# Patient Record
Sex: FEMALE | Race: ASIAN | NOT HISPANIC OR LATINO | Employment: FULL TIME | URBAN - METROPOLITAN AREA
[De-identification: names, ages, dates, MRNs, and addresses within clinical notes are randomized per-mention and may not be internally consistent; named-entity substitution may affect disease eponyms.]

---

## 2017-12-11 ENCOUNTER — HOSPITAL ENCOUNTER (OUTPATIENT)
Dept: RADIOLOGY | Facility: MEDICAL CENTER | Age: 37
End: 2017-12-11
Attending: SPECIALIST
Payer: COMMERCIAL

## 2017-12-11 DIAGNOSIS — R10.32 ABDOMINAL PAIN, LEFT LOWER QUADRANT: ICD-10-CM

## 2017-12-11 PROCEDURE — 76830 TRANSVAGINAL US NON-OB: CPT

## 2018-02-05 ENCOUNTER — HOSPITAL ENCOUNTER (OUTPATIENT)
Dept: RADIOLOGY | Facility: MEDICAL CENTER | Age: 38
End: 2018-02-05
Attending: SPECIALIST
Payer: COMMERCIAL

## 2018-02-05 DIAGNOSIS — Z30.431 CHECKING OF INTRAUTERINE DEVICE: ICD-10-CM

## 2018-02-05 DIAGNOSIS — R10.2 FEMALE PELVIC PAIN: ICD-10-CM

## 2018-02-05 PROCEDURE — 76830 TRANSVAGINAL US NON-OB: CPT

## 2018-07-16 ENCOUNTER — OFFICE VISIT (OUTPATIENT)
Dept: INTERNAL MEDICINE | Facility: MEDICAL CENTER | Age: 38
End: 2018-07-16
Payer: COMMERCIAL

## 2018-07-16 VITALS
OXYGEN SATURATION: 98 % | BODY MASS INDEX: 25.62 KG/M2 | WEIGHT: 144.6 LBS | SYSTOLIC BLOOD PRESSURE: 94 MMHG | DIASTOLIC BLOOD PRESSURE: 63 MMHG | TEMPERATURE: 98.1 F | HEIGHT: 63 IN | HEART RATE: 70 BPM

## 2018-07-16 DIAGNOSIS — Z00.00 ROUTINE ADULT HEALTH MAINTENANCE: ICD-10-CM

## 2018-07-16 DIAGNOSIS — G43.829 MENSTRUAL MIGRAINE WITHOUT STATUS MIGRAINOSUS, NOT INTRACTABLE: ICD-10-CM

## 2018-07-16 DIAGNOSIS — D18.01: Primary | ICD-10-CM

## 2018-07-16 PROCEDURE — 99204 OFFICE O/P NEW MOD 45 MIN: CPT | Mod: GC | Performed by: INTERNAL MEDICINE

## 2018-07-16 ASSESSMENT — ENCOUNTER SYMPTOMS
SHORTNESS OF BREATH: 0
FEVER: 0
MYALGIAS: 0
CHILLS: 0
PALPITATIONS: 0
COUGH: 0
NAUSEA: 0
ABDOMINAL PAIN: 0
VOMITING: 0
HEARTBURN: 0

## 2018-07-16 ASSESSMENT — PATIENT HEALTH QUESTIONNAIRE - PHQ9: CLINICAL INTERPRETATION OF PHQ2 SCORE: 0

## 2018-07-16 ASSESSMENT — PAIN SCALES - GENERAL: PAINLEVEL: NO PAIN

## 2018-07-16 NOTE — PROGRESS NOTES
"New Patient to Establish    Reason to establish: New patient to establish    CC: check on skin nodules     HPI: This is a 39yo/F with a history of migraines and fibroids, here to check up on multiple subcutaneous nodules on the upper extremities.     The \"nodules\" began about 2 years ago, when she noticed 2 subcutaneous nodules which were not painful, not bothersome but she wanted to get check out just to make sure it wasn't anything to worry about. She notices that the lesion on the (L) upper arm occasionally throbs and causes her some discomfort. She denies any recent rapid changes, pruritus, trauma to the area, draining from the lesions, previous history of any skin problems. She had a family member, Dr. Hernandez (dermatologist) check maybe 1 year ago - said it was prob a sebaceaous cyst.    Patient states she has a history of migraines for years, related to her menstrual cycle. She was having monthly unilateral dull throbbing pain at the (L) frontal area, non-radiating associated with nausea and photophobia. Currently, she does not take preventive therapy, but she does have an abortive regimen which includes zofran, fish oil, Co-enzyme Q10 and massage which she states is very helpful. She was prescribed Imitrex in the past, but stated that it made her migraine worse.   4 months ago in March 2018, her gynecologist Dr. Contreras placed an IUD which has helped decrease the frequency to just once in the last two months, but with increased severity and duration of pain. She complains of joint and back pain since having the IUD placed. Her gynecologist would like for her to continue the IUD for some time in order to see if it will be effective for her.    Patient Active Problem List    Diagnosis Date Noted   • Routine adult health maintenance 07/16/2018   • Venous hemangioma of skin and subcutaneous tissue 07/16/2018   • Menstrual migraine without status migrainosus, not intractable 07/16/2018   • Dyspareunia 07/23/2013   • " Fibroid uterus 01/20/2011   • Pelvic pain in female 01/20/2011     Past Medical History:   Diagnosis Date   • Allergy    • Bronchitis 12/2012   • Migraine    • Other specified symptom associated with female genital organs    • Urinary bladder disorder     occas. UTI's     Current Outpatient Prescriptions   Medication Sig Dispense Refill   • Cholecalciferol (VITAMIN D PO) Take  by mouth.     • B Complex Vitamins (VITAMIN-B COMPLEX PO) Take  by mouth.     • ondansetron (ZOFRAN) 4 MG TABS Take 1 Tab by mouth every 8 hours as needed for Nausea/Vomiting. 15 Tab 0   • vitamin D, Ergocalciferol, (DRISDOL) 69874 UNITS CAPS capsule Take 50,000 Units by mouth every 7 days.     • acetaminophen (TYLENOL) 500 MG TABS Take 500-1,000 mg by mouth every 6 hours as needed.     • ibuprofen (MOTRIN) 200 MG TABS Take 400 mg by mouth as needed.     • ethinyl estradiol-etonogestrel (NUVARING) 0.12-0.015 MG/24HR vaginal ring Insert 1 Each in vagina every 21 days.     • Coenzyme Q10 (CO Q 10 PO) Take 200 mg by mouth as needed. For migraines     • 5-Hydroxytryptophan (5-HTP PO) Take 100 mg by mouth as needed. For migrains     • pseudoephedrine (SUDOGEST) 30 MG TABS Take 60 mg by mouth every four hours as needed.     • cetirizine (ZYRTEC) 10 MG TABS Take 10 mg by mouth as needed.     • Lactobacillus (ACIDOPHILUS PO) Take 2 Tabs by mouth every day.       No current facility-administered medications for this visit.      Allergies as of 07/16/2018 - Reviewed 07/16/2018   Allergen Reaction Noted   • Amoxicillin Hives and Rash 02/13/2010   • Levaquin Nausea 02/13/2010   • Other food Hives 07/15/2013     Social History     Social History   • Marital status: Single     Spouse name: N/A   • Number of children: N/A   • Years of education: N/A     Occupational History   • Not on file.     Social History Main Topics   • Smoking status: Never Smoker   • Smokeless tobacco: Never Used   • Alcohol use Yes      Comment: 1-2 drinks a month    • Drug use: No  "  • Sexual activity: Yes     Birth control/ protection: IUD     Other Topics Concern   • Not on file     Social History Narrative   • No narrative on file     Family History   Problem Relation Age of Onset   • Hypertension Mother    • Heart Disease Brother    • Diabetes Maternal Grandfather    • Cancer Maternal Grandfather      STOMACHE     Past Surgical History:   Procedure Laterality Date   • MYOMECTOMY ROBOTIC  7/23/2013    Performed by Allen Whitten M.D. at SURGERY [facility] ORS   • CA EXTRACTION ERUPTED TOOTH/EXR      wisdom teeth     ROS: As per HPI. Additional pertinent symptoms as noted below.    Review of Systems   Constitutional: Negative for chills and fever.   HENT: Negative for hearing loss.    Respiratory: Negative for cough and shortness of breath.    Cardiovascular: Negative for chest pain and palpitations.   Gastrointestinal: Negative for abdominal pain, heartburn, nausea and vomiting.   Genitourinary: Negative for dysuria and hematuria.   Musculoskeletal: Negative for joint pain and myalgias.   Skin: Negative for itching and rash.     BP (!) 94/63   Pulse 70   Temp 36.7 °C (98.1 °F)   Ht 1.6 m (5' 3\")   Wt 65.6 kg (144 lb 9.6 oz)   LMP 07/02/2018   SpO2 98%   Breastfeeding? No   BMI 25.61 kg/m²     Physical Exam   Constitutional: She is oriented to person, place, and time. She appears well-developed and well-nourished.   HENT:   Head: Normocephalic and atraumatic.   Eyes: Conjunctivae and EOM are normal.   Neck: Normal range of motion. Neck supple.   Cardiovascular: Normal rate, regular rhythm, normal heart sounds and intact distal pulses.    Pulmonary/Chest: Effort normal and breath sounds normal.   Abdominal: Soft. There is no tenderness.   Musculoskeletal: Normal range of motion.   Lymphadenopathy:     She has no cervical adenopathy.   Neurological: She is alert and oriented to person, place, and time.   Skin: Skin is warm and dry. No rash noted. No erythema. No pallor.      "   Psychiatric: She has a normal mood and affect. Her behavior is normal.   Vitals reviewed.  Physical Exam   Constitutional: She is oriented to person, place, and time. She appears well-developed and well-nourished.   HENT:   Head: Normocephalic and atraumatic.   Eyes: Conjunctivae and EOM are normal.   Neck: Normal range of motion. Neck supple.   Cardiovascular: Normal rate, regular rhythm, normal heart sounds and intact distal pulses.    Pulmonary/Chest: Effort normal and breath sounds normal.   Abdominal: Soft. There is no tenderness.   Musculoskeletal: Normal range of motion.   Lymphadenopathy:     She has no cervical adenopathy.   Neurological: She is alert and oriented to person, place, and time.   Skin: Skin is warm and dry. No rash noted. No erythema. No pallor.        Psychiatric: She has a normal mood and affect. Her behavior is normal.   Vitals reviewed.    Assessment and Plan  #Venous hemangioma of skin and subcutaneous tissue  - Discussed conservative measures with patient   - Reassured patient that this is a benign appearing lesion, and that surgical intervention may lead to scarring or disruption of the vessels leading to unwanted complications   - For ultrasound if there is increase in pain, nodule size, more discoloration, etc   - Discussed the possibility of sclerotherapy with referral to a vascular surgeon if the lesion becomes problematic   - Observe for now.     #Menstrual migraine without status migrainosus, not intractable  - Continue abortive measures as needed (zofran, fish oil, Coq10, sleep, massage)  - Monitor for medication overuse  - Avoid identifiable migraine triggers   - Maintain regular exercise, regular diet, sleeping habits   - Encourage continued use of IUD   - Continue follow-up with Gynecology    #Health Maintenance:  - Never smoker  - Occasional alcohol use   - No drug use   - monagamous with boyfriend, IUD in place   - Works at [employer] ([employer] pre-requisites), hoping to go into  PA school   - Pap smear done (1/2018): negative, due 1/2019   - STD screen done 1/2018  - Order CBC, CMP, TSH, lipid profile     Followup: Return in about 1 year (around 7/16/2019), or if symptoms worsen or fail to improve, for Short.    Signed by: Yara Villeda M.D.

## 2018-07-20 ENCOUNTER — HOSPITAL ENCOUNTER (OUTPATIENT)
Facility: MEDICAL CENTER | Age: 38
End: 2018-07-20
Attending: PREVENTIVE MEDICINE
Payer: COMMERCIAL

## 2018-07-20 ENCOUNTER — EH NON-PROVIDER (OUTPATIENT)
Dept: OCCUPATIONAL MEDICINE | Facility: CLINIC | Age: 38
End: 2018-07-20

## 2018-07-20 DIAGNOSIS — Z02.89 VISIT FOR OCCUPATIONAL HEALTH EXAMINATION: ICD-10-CM

## 2018-07-20 DIAGNOSIS — Z02.89 VISIT FOR OCCUPATIONAL HEALTH EXAMINATION: Primary | ICD-10-CM

## 2018-07-20 PROCEDURE — 90636 HEP A/HEP B VACC ADULT IM: CPT | Performed by: PREVENTIVE MEDICINE

## 2018-07-21 LAB
ALBUMIN SERPL-MCNC: 4.5 G/DL (ref 3.5–5.5)
ALBUMIN/GLOB SERPL: 1.6 {RATIO} (ref 1.2–2.2)
ALP SERPL-CCNC: 63 IU/L (ref 39–117)
ALT SERPL-CCNC: 12 IU/L (ref 0–32)
AST SERPL-CCNC: 13 IU/L (ref 0–40)
BASOPHILS # BLD AUTO: 0 X10E3/UL (ref 0–0.2)
BASOPHILS NFR BLD AUTO: 0 %
BILIRUB SERPL-MCNC: 0.5 MG/DL (ref 0–1.2)
BUN SERPL-MCNC: 14 MG/DL (ref 6–20)
BUN/CREAT SERPL: 16 (ref 9–23)
CALCIUM SERPL-MCNC: 9.2 MG/DL (ref 8.7–10.2)
CHLORIDE SERPL-SCNC: 101 MMOL/L (ref 96–106)
CHOLEST SERPL-MCNC: 189 MG/DL (ref 100–199)
CO2 SERPL-SCNC: 20 MMOL/L (ref 20–29)
CREAT SERPL-MCNC: 0.85 MG/DL (ref 0.57–1)
EOSINOPHIL # BLD AUTO: 0.2 X10E3/UL (ref 0–0.4)
EOSINOPHIL NFR BLD AUTO: 2 %
ERYTHROCYTE [DISTWIDTH] IN BLOOD BY AUTOMATED COUNT: 13.6 % (ref 12.3–15.4)
GLOBULIN SER CALC-MCNC: 2.9 G/DL (ref 1.5–4.5)
GLUCOSE SERPL-MCNC: 73 MG/DL (ref 65–99)
HCT VFR BLD AUTO: 45.1 % (ref 34–46.6)
HDLC SERPL-MCNC: 61 MG/DL
HGB BLD-MCNC: 15 G/DL (ref 11.1–15.9)
IF AFRICAN AMERICAN  100797: 100 ML/MIN/1.73
IF NON AFRICAN AMER 100791: 87 ML/MIN/1.73
IMM GRANULOCYTES # BLD: 0 X10E3/UL (ref 0–0.1)
IMM GRANULOCYTES NFR BLD: 0 %
IMMATURE CELLS  115398: ABNORMAL
LABORATORY COMMENT REPORT: ABNORMAL
LDLC SERPL CALC-MCNC: 102 MG/DL (ref 0–99)
LYMPHOCYTES # BLD AUTO: 3.6 X10E3/UL (ref 0.7–3.1)
LYMPHOCYTES NFR BLD AUTO: 39 %
MCH RBC QN AUTO: 29.4 PG (ref 26.6–33)
MCHC RBC AUTO-ENTMCNC: 33.3 G/DL (ref 31.5–35.7)
MCV RBC AUTO: 88 FL (ref 79–97)
MONOCYTES # BLD AUTO: 0.6 X10E3/UL (ref 0.1–0.9)
MONOCYTES NFR BLD AUTO: 7 %
MORPHOLOGY BLD-IMP: ABNORMAL
NEUTROPHILS # BLD AUTO: 4.8 X10E3/UL (ref 1.4–7)
NEUTROPHILS NFR BLD AUTO: 52 %
NRBC BLD AUTO-RTO: ABNORMAL %
PLATELET # BLD AUTO: 303 X10E3/UL (ref 150–379)
POTASSIUM SERPL-SCNC: 4.1 MMOL/L (ref 3.5–5.2)
PROT SERPL-MCNC: 7.4 G/DL (ref 6–8.5)
RBC # BLD AUTO: 5.1 X10E6/UL (ref 3.77–5.28)
RUBV AB SER QL: 45 IU/ML
SODIUM SERPL-SCNC: 138 MMOL/L (ref 134–144)
TRIGL SERPL-MCNC: 130 MG/DL (ref 0–149)
TSH SERPL DL<=0.005 MIU/L-ACNC: 3.63 UIU/ML (ref 0.45–4.5)
VLDLC SERPL CALC-MCNC: 26 MG/DL (ref 5–40)
WBC # BLD AUTO: 9.2 X10E3/UL (ref 3.4–10.8)

## 2018-07-23 LAB
M TB TUBERC IFN-G BLD QL: NEGATIVE
M TB TUBERC IFN-G/MITOGEN IGNF BLD: 0.01
M TB TUBERC IGNF/MITOGEN IGNF CONTROL: 35.62 [IU]/ML
MEV IGG SER IA-ACNC: 2
MITOGEN IGNF BCKGRD COR BLD-ACNC: 0.03 [IU]/ML
MUV IGG SER IA-ACNC: 1.08
VZV IGG SER IA-ACNC: 1.88

## 2018-07-30 ENCOUNTER — EH NON-PROVIDER (OUTPATIENT)
Dept: OCCUPATIONAL MEDICINE | Facility: CLINIC | Age: 38
End: 2018-07-30

## 2018-07-30 DIAGNOSIS — Z71.85 IMMUNIZATION COUNSELING: ICD-10-CM

## 2018-07-30 PROCEDURE — 8916 PR CLEARANCE ONLY: Performed by: PREVENTIVE MEDICINE

## 2018-08-03 ENCOUNTER — TELEPHONE (OUTPATIENT)
Dept: INTERNAL MEDICINE | Facility: MEDICAL CENTER | Age: 38
End: 2018-08-03

## 2018-08-03 NOTE — TELEPHONE ENCOUNTER
1. Caller Name: Nela Peres                      Call Back Number: [phone] (home)       2. Message: Patient called left voice message requesting lab results. Dr Villeda on vacation.    3. Patient approves office to leave a detailed voicemail/MyChart message: yes

## 2018-08-06 NOTE — TELEPHONE ENCOUNTER
Tried calling the patient but patient did not answer the call and tried leaving message but did not allow to leave the voicemail.  Advised to come to the appointment to review the results ad have further plan of management.

## 2018-10-26 ENCOUNTER — HOSPITAL ENCOUNTER (OUTPATIENT)
Dept: RADIOLOGY | Facility: MEDICAL CENTER | Age: 38
End: 2018-10-26
Attending: SPECIALIST
Payer: COMMERCIAL

## 2018-10-26 DIAGNOSIS — R10.31 ABDOMINAL PAIN, RIGHT LOWER QUADRANT: ICD-10-CM

## 2018-10-26 PROCEDURE — 76830 TRANSVAGINAL US NON-OB: CPT

## 2018-11-03 ENCOUNTER — OFFICE VISIT (OUTPATIENT)
Dept: URGENT CARE | Facility: CLINIC | Age: 38
End: 2018-11-03
Payer: COMMERCIAL

## 2018-11-03 ENCOUNTER — HOSPITAL ENCOUNTER (OUTPATIENT)
Dept: RADIOLOGY | Facility: MEDICAL CENTER | Age: 38
End: 2018-11-03
Attending: NURSE PRACTITIONER
Payer: COMMERCIAL

## 2018-11-03 VITALS
HEART RATE: 80 BPM | TEMPERATURE: 98.1 F | RESPIRATION RATE: 16 BRPM | WEIGHT: 140 LBS | HEIGHT: 63 IN | OXYGEN SATURATION: 94 % | BODY MASS INDEX: 24.8 KG/M2 | DIASTOLIC BLOOD PRESSURE: 80 MMHG | SYSTOLIC BLOOD PRESSURE: 110 MMHG

## 2018-11-03 DIAGNOSIS — R10.84 GENERALIZED ABDOMINAL PAIN: ICD-10-CM

## 2018-11-03 DIAGNOSIS — R11.0 NAUSEA: ICD-10-CM

## 2018-11-03 LAB
INT CON NEG: NORMAL
INT CON POS: NORMAL
POC URINE PREGNANCY TEST: NORMAL

## 2018-11-03 PROCEDURE — 81025 URINE PREGNANCY TEST: CPT | Performed by: NURSE PRACTITIONER

## 2018-11-03 PROCEDURE — 99202 OFFICE O/P NEW SF 15 MIN: CPT | Performed by: NURSE PRACTITIONER

## 2018-11-03 PROCEDURE — 700117 HCHG RX CONTRAST REV CODE 255: Performed by: NURSE PRACTITIONER

## 2018-11-03 PROCEDURE — 74177 CT ABD & PELVIS W/CONTRAST: CPT

## 2018-11-03 RX ORDER — DOXYCYCLINE 100 MG/10ML
INJECTION, POWDER, LYOPHILIZED, FOR SOLUTION INTRAVENOUS
COMMUNITY

## 2018-11-03 RX ORDER — ONDANSETRON 4 MG/1
4 TABLET, ORALLY DISINTEGRATING ORAL EVERY 8 HOURS PRN
Qty: 10 TAB | Refills: 0 | Status: SHIPPED | OUTPATIENT
Start: 2018-11-03

## 2018-11-03 RX ADMIN — IOHEXOL 100 ML: 350 INJECTION, SOLUTION INTRAVENOUS at 14:36

## 2018-11-03 RX ADMIN — IOHEXOL 50 ML: 240 INJECTION, SOLUTION INTRATHECAL; INTRAVASCULAR; INTRAVENOUS; ORAL at 14:35

## 2018-11-03 ASSESSMENT — ENCOUNTER SYMPTOMS
ABDOMINAL PAIN: 1
CHILLS: 0
FEVER: 0

## 2018-11-03 NOTE — PROGRESS NOTES
Subjective:      Nela Peres is a 38 y.o. female who presents with Abdominal Pain (patient had IUD removed last thursday, had CT done on friday but is still experiencing abdominal pain. Vomiting, back pain )    Past Medical History:   Diagnosis Date   • Allergy    • Bronchitis 12/2012   • Migraine    • Other specified symptom associated with female genital organs    • Urinary bladder disorder     occas. UTI's     Social History     Social History   • Marital status: Single     Spouse name: N/A   • Number of children: N/A   • Years of education: N/A     Occupational History   • Not on file.     Social History Main Topics   • Smoking status: Never Smoker   • Smokeless tobacco: Never Used   • Alcohol use Yes      Comment: 1-2 drinks a month    • Drug use: No   • Sexual activity: Yes     Birth control/ protection: IUD     Other Topics Concern   • Not on file     Social History Narrative   • No narrative on file     Family History   Problem Relation Age of Onset   • Hypertension Mother    • Heart Disease Brother    • Diabetes Maternal Grandfather    • Cancer Maternal Grandfather         STOMACHE       Allergies: Amoxicillin; Levaquin; and Other food    Patient is a 38-year-old female who presents today with complaint of generalized abdominal pain.  Symptoms have been ongoing over the last week.  States that 9 days ago she had an IUD removed and she has been having pain since.  Her gynecologist did a pelvic ultrasound 7 days ago and this was within normal limits.  Patient states that she has not gone to work this week and has continued to have pain.  Patient has been on doxycycline for a presumed urinary tract infection and states that she began to have epigastric pain and nausea with occasional vomiting after starting the doxycycline.  Her primary concern is the lower abdominal pain and states that that is the greatest area of discomfort at this time.          Abdominal Pain   This is a new problem. The current  "episode started 1 to 4 weeks ago. The onset quality is undetermined. The problem occurs intermittently. The problem has been waxing and waning. The pain is located in the generalized abdominal region. The pain is moderate. The quality of the pain is aching. Pertinent negatives include no fever. Nothing aggravates the pain. The pain is relieved by nothing. She has tried nothing for the symptoms. The treatment provided no relief.       Review of Systems   Constitutional: Positive for malaise/fatigue. Negative for chills and fever.   Gastrointestinal: Positive for abdominal pain.   All other systems reviewed and are negative.         Objective:     /80   Pulse 80   Temp 36.7 °C (98.1 °F)   Resp 16   Ht 1.6 m (5' 3\")   Wt 63.5 kg (140 lb)   SpO2 94%   BMI 24.80 kg/m²      Physical Exam   Constitutional: She is oriented to person, place, and time. She appears well-developed and well-nourished.   Neck: Normal range of motion. Neck supple.   Cardiovascular: Normal rate and regular rhythm.    Pulmonary/Chest: Effort normal and breath sounds normal.   Abdominal: Soft. Bowel sounds are normal. She exhibits no distension. There is tenderness. There is no rebound and no guarding.   Abdomen is soft with no guarding or rebound tenderness.  Patient is generally tender across the lower abdomen and in the epigastric and left upper quadrant.    Neurological: She is alert and oriented to person, place, and time.   Skin: Skin is warm and dry. Capillary refill takes less than 2 seconds.   Psychiatric: She has a normal mood and affect. Her behavior is normal. Judgment and thought content normal.   Vitals reviewed.    UA:     Urine hCG: negative     CT abd/pelvis with:     11/3/2018 2:34 PM    HISTORY/REASON FOR EXAM:  Pain.  Epigastric pain, nausea and vomiting    TECHNIQUE/EXAM DESCRIPTION:  CT scan of the abdomen and pelvis with contrast.    Contrast-enhanced helical scanning was obtained from the diaphragmatic domes " through the pubic symphysis following the bolus administration of nonionic contrast without complication.    100, 50 mL of Omnipaque 350 nonionic contrast was administered without complication.    Low dose optimization technique was utilized for this CT exam including automated exposure control and adjustment of the mA and/or kV according to patient size.    COMPARISON: No prior studies available.    FINDINGS:  Lung bases: The visualized lung bases are clear.    Abdomen:  No free air is seen in the abdomen or pelvis. There is a hypodense lesion in the posterior right lobe of the liver with peripheral nodular enhancement measuring 2.7 x 2 cm compatible with a hemangioma. Portal vein is patent. Gallbladder appears   unremarkable. There is no biliary ductal dilatation. Pancreas appears unremarkable. Spleen is not enlarged. No adrenal mass is identified. There is an extrarenal pelvis on the right. There is no evidence of hydronephrosis. Aorta is not aneurysmal. There   are small inguinal, retroperitoneal and mesenteric lymph nodes.    There is no evidence of bowel obstruction. The distal descending and proximal sigmoid colon is decompressed, limiting evaluation. Terminal ileum and visualized appendix are unremarkable.    Bladder is moderately distended. Uterus is anteverted. There is a corpus luteum cyst on the left measuring 2 cm.    No free fluid is seen in the abdomen or pelvis.    No acute osseous abnormality is identified.   Impression       No acute intra-abdominal abnormality is seen.    2.7 x 2 cm hepatic lesion likely represents a hemangioma.    Extrarenal pelvis on the right. No evidence of hydronephrosis.    2 cm left ovarian corpus luteum cyst.     Patient notified by phone at 6 PM of results.  Patient verbalized understanding and agreement.  She is nauseous and a will send prescription for Zofran to her pharmacy for management of this.  I have advised patient also follow-up with her gynecologist,   Fish next week.  ER precautions for worsening of pain.          Assessment/Plan:     1. Generalized abdominal pain    - POCT Urinalysis  - POCT Pregnancy  -zofran odt as needed for nausea.  -CT abd/pelvis with contrast; will call results.   -ER precautions for worsening of symtpoms  -follow up also with GYN next week.

## 2019-03-08 ENCOUNTER — HOSPITAL ENCOUNTER (OUTPATIENT)
Dept: RADIOLOGY | Facility: MEDICAL CENTER | Age: 39
End: 2019-03-08
Attending: FAMILY MEDICINE
Payer: COMMERCIAL

## 2019-03-08 DIAGNOSIS — J45.901 EXTRINSIC ASTHMA WITH ACUTE EXACERBATION, UNSPECIFIED ASTHMA SEVERITY, UNSPECIFIED WHETHER PERSISTENT: ICD-10-CM

## 2019-03-08 PROCEDURE — 71046 X-RAY EXAM CHEST 2 VIEWS: CPT

## 2019-03-14 ENCOUNTER — OFFICE VISIT (OUTPATIENT)
Dept: URGENT CARE | Facility: CLINIC | Age: 39
End: 2019-03-14
Payer: COMMERCIAL

## 2019-03-14 VITALS
TEMPERATURE: 98.1 F | BODY MASS INDEX: 24.8 KG/M2 | HEART RATE: 90 BPM | OXYGEN SATURATION: 99 % | DIASTOLIC BLOOD PRESSURE: 78 MMHG | WEIGHT: 140 LBS | HEIGHT: 63 IN | RESPIRATION RATE: 16 BRPM | SYSTOLIC BLOOD PRESSURE: 118 MMHG

## 2019-03-14 DIAGNOSIS — R09.82 PND (POST-NASAL DRIP): ICD-10-CM

## 2019-03-14 DIAGNOSIS — J20.9 BRONCHITIS WITH ASTHMA, ACUTE: ICD-10-CM

## 2019-03-14 DIAGNOSIS — J45.909 BRONCHITIS WITH ASTHMA, ACUTE: ICD-10-CM

## 2019-03-14 PROCEDURE — 99214 OFFICE O/P EST MOD 30 MIN: CPT | Performed by: NURSE PRACTITIONER

## 2019-03-14 RX ORDER — DOXYCYCLINE HYCLATE 100 MG
100 TABLET ORAL 2 TIMES DAILY
Qty: 14 TAB | Refills: 0 | Status: SHIPPED | OUTPATIENT
Start: 2019-03-14 | End: 2019-03-21

## 2019-03-14 RX ORDER — FLUTICASONE PROPIONATE 50 MCG
2 SPRAY, SUSPENSION (ML) NASAL DAILY
Qty: 1 BOTTLE | Refills: 3 | Status: SHIPPED | OUTPATIENT
Start: 2019-03-14

## 2019-03-14 RX ORDER — METHYLPREDNISOLONE 4 MG/1
TABLET ORAL
Qty: 1 KIT | Refills: 0 | Status: SHIPPED | OUTPATIENT
Start: 2019-03-14

## 2019-03-14 ASSESSMENT — ENCOUNTER SYMPTOMS
WEAKNESS: 0
SORE THROAT: 0
HEADACHES: 0
BACK PAIN: 0
WHEEZING: 0
FEVER: 0
PALPITATIONS: 0
SHORTNESS OF BREATH: 0
MYALGIAS: 0
COUGH: 1
TINGLING: 0
CHILLS: 0
DIZZINESS: 0
SENSORY CHANGE: 0
ORTHOPNEA: 0
SPUTUM PRODUCTION: 1
SINUS PAIN: 0

## 2019-03-14 NOTE — PROGRESS NOTES
"Subjective:      Nela Peres is a 38 y.o. female who presents with Cough (x4 months, coughing up yellow mucus x10 days, really bad this morning and had a bad asthma attack)            HPI  C/o cough x 1 month. States has yellow phlegm with mild asthma problems. Was seen for this with PCP and CXR done with negative results. Using albuterol, Advair just introduced and a steroid inhaler which she has been using. Denies fever or fatigue. States \"cough just won't go away\". States nasal congestion with PND, using saline rinse without flonase use. [place].    PMH:  has a past medical history of Allergy; Bronchitis (12/2012); Migraine; Other specified symptom associated with female genital organs; and Urinary bladder disorder.  MEDS:   Current Outpatient Prescriptions:   •  doxycycline (VIBRAMYCIN) 100 MG Tab, Take 1 Tab by mouth 2 times a day for 7 days., Disp: 14 Tab, Rfl: 0  •  MethylPREDNISolone (MEDROL DOSEPAK) 4 MG Tablet Therapy Pack, Use as directed, Disp: 1 Kit, Rfl: 0  •  fluticasone (FLONASE) 50 MCG/ACT nasal spray, Spray 2 Sprays in nose every day., Disp: 1 Bottle, Rfl: 3  •  doxycycline (VIBRAMYCIN) 100 MG Recon Soln, by Intravenous route., Disp: , Rfl:   •  ondansetron (ZOFRAN ODT) 4 MG TABLET DISPERSIBLE, Take 1 Tab by mouth every 8 hours as needed for Nausea., Disp: 10 Tab, Rfl: 0  •  Cholecalciferol (VITAMIN D PO), Take  by mouth., Disp: , Rfl:   •  B Complex Vitamins (VITAMIN-B COMPLEX PO), Take  by mouth., Disp: , Rfl:   •  ondansetron (ZOFRAN) 4 MG TABS, Take 1 Tab by mouth every 8 hours as needed for Nausea/Vomiting. (Patient not taking: Reported on 11/3/2018), Disp: 15 Tab, Rfl: 0  •  vitamin D, Ergocalciferol, (DRISDOL) 43705 UNITS CAPS capsule, Take 50,000 Units by mouth every 7 days., Disp: , Rfl:   •  acetaminophen (TYLENOL) 500 MG TABS, Take 500-1,000 mg by mouth every 6 hours as needed., Disp: , Rfl:   •  ibuprofen (MOTRIN) 200 MG TABS, Take 400 mg by mouth as " needed., Disp: , Rfl:   •  ethinyl estradiol-etonogestrel (NUVARING) 0.12-0.015 MG/24HR vaginal ring, Insert 1 Each in vagina every 21 days., Disp: , Rfl:   •  Coenzyme Q10 (CO Q 10 PO), Take 200 mg by mouth as needed. For migraines, Disp: , Rfl:   •  5-Hydroxytryptophan (5-HTP PO), Take 100 mg by mouth as needed. For migrains, Disp: , Rfl:   •  pseudoephedrine (SUDOGEST) 30 MG TABS, Take 60 mg by mouth every four hours as needed., Disp: , Rfl:   •  cetirizine (ZYRTEC) 10 MG TABS, Take 10 mg by mouth as needed., Disp: , Rfl:   •  Lactobacillus (ACIDOPHILUS PO), Take 2 Tabs by mouth every day., Disp: , Rfl:   ALLERGIES:   Allergies   Allergen Reactions   • Amoxicillin Hives and Rash   • Levaquin Nausea     dizzy   • Other Food Hives     Chocolate causes migrains     SURGHX:   Past Surgical History:   Procedure Laterality Date   • MYOMECTOMY ROBOTIC  7/23/2013    Performed by Allen Whitten M.D. at SURGERY [facility]S   • [place] EXTRACTION ERUPTED TOOTH/EXR      wisdom teeth     SOCHX:  reports that she has never smoked. She has never used smokeless tobacco. She reports that she drinks alcohol. She reports that she does not use drugs.  FH: Family history was reviewed, no pertinent findings to report    Review of Systems   Constitutional: Negative for chills, fever and malaise/fatigue.   HENT: Positive for congestion. Negative for ear pain, sinus pain and sore throat.    Respiratory: Positive for cough and sputum production. Negative for shortness of breath and wheezing.    Cardiovascular: Negative for chest pain, palpitations and orthopnea.   Musculoskeletal: Negative for back pain and myalgias.   Skin: Negative for itching and rash.   Neurological: Negative for dizziness, tingling, sensory change, weakness and headaches.   Endo/Heme/Allergies: Negative for environmental allergies.   All other systems reviewed and are negative.         Objective:     /78   Pulse 90   Temp 36.7 °C (98.1 °F)   Resp 16   Ht  "1.6 m (5' 3\")   Wt 63.5 kg (140 lb)   SpO2 99%   BMI 24.80 kg/m²      Physical Exam   Constitutional: She is oriented to person, place, and time. She appears well-developed and well-nourished. She is active and cooperative.  Non-toxic appearance. She does not have a sickly appearance. She does not appear ill. No distress.   HENT:   Head: Normocephalic.   Right Ear: External ear and ear canal normal. A middle ear effusion is present.   Left Ear: External ear and ear canal normal. A middle ear effusion is present.   Nose: Mucosal edema and rhinorrhea present.   Mouth/Throat: Uvula is midline. Mucous membranes are dry. No uvula swelling. Posterior oropharyngeal erythema present. No posterior oropharyngeal edema.   Eyes: Pupils are equal, round, and reactive to light. Conjunctivae and EOM are normal. Right eye exhibits no discharge. Left eye exhibits no discharge.   Neck: Normal range of motion.   Cardiovascular: Normal rate and regular rhythm.    Pulmonary/Chest: Effort normal. No accessory muscle usage. No respiratory distress. She has no decreased breath sounds. She has no wheezes. She has no rhonchi. She has no rales.   Abdominal: Soft. Bowel sounds are normal. She exhibits no distension. There is no tenderness. There is no rebound and no guarding.   Musculoskeletal: Normal range of motion.   Lymphadenopathy:     She has no cervical adenopathy.   Neurological: She is alert and oriented to person, place, and time.   Skin: Skin is warm and dry. She is not diaphoretic.               Assessment/Plan:     1. Bronchitis with asthma, acute    - doxycycline (VIBRAMYCIN) 100 MG Tab; Take 1 Tab by mouth 2 times a day for 7 days.  Dispense: 14 Tab; Refill: 0  - MethylPREDNISolone (MEDROL DOSEPAK) 4 MG Tablet Therapy Pack; Use as directed  Dispense: 1 Kit; Refill: 0    2. PND (post-nasal drip)    - fluticasone (FLONASE) 50 MCG/ACT nasal spray; Spray 2 Sprays in nose every day.  Dispense: 1 Bottle; Refill: 3     Increase " water intake  May use Ibuprofen/Tylenol prn for fever or body aches  Get rest  May use daily longer acting antihistamine prn  May use saline nasal spray prn to flush any nasal congestion   Use inhalers prn for SOB with cough  May use OTC cough suppressant medications like Robitussin/Delsym prn  Monitor for fevers, productive cough, SOB, CP, chest tightness- need re-evaluation

## 2019-05-06 ENCOUNTER — HOSPITAL ENCOUNTER (OUTPATIENT)
Dept: LAB | Facility: MEDICAL CENTER | Age: 39
End: 2019-05-06
Attending: OBSTETRICS & GYNECOLOGY
Payer: COMMERCIAL

## 2019-05-06 PROCEDURE — 369999 HCHG MISC LAB CHARGE

## 2019-05-06 PROCEDURE — 88142 CYTOPATH C/V THIN LAYER: CPT

## 2019-05-20 LAB — TEST NAME 95000: NORMAL

## 2019-06-03 ENCOUNTER — HOSPITAL ENCOUNTER (OUTPATIENT)
Dept: RADIOLOGY | Facility: MEDICAL CENTER | Age: 39
End: 2019-06-03
Attending: SPECIALIST
Payer: COMMERCIAL

## 2019-06-03 DIAGNOSIS — N63.23 BREAST LUMP ON LEFT SIDE AT 4 O'CLOCK POSITION: ICD-10-CM

## 2019-06-03 PROCEDURE — G0279 TOMOSYNTHESIS, MAMMO: HCPCS

## 2019-06-03 PROCEDURE — 76642 ULTRASOUND BREAST LIMITED: CPT | Mod: LT

## 2020-12-07 ENCOUNTER — HOSPITAL ENCOUNTER (OUTPATIENT)
Dept: LAB | Facility: MEDICAL CENTER | Age: 40
End: 2020-12-07
Attending: SPECIALIST
Payer: COMMERCIAL

## 2020-12-07 LAB
BASOPHILS # BLD AUTO: 0.6 % (ref 0–1.8)
BASOPHILS # BLD: 0.05 K/UL (ref 0–0.12)
EOSINOPHIL # BLD AUTO: 0.06 K/UL (ref 0–0.51)
EOSINOPHIL NFR BLD: 0.7 % (ref 0–6.9)
ERYTHROCYTE [DISTWIDTH] IN BLOOD BY AUTOMATED COUNT: 41.2 FL (ref 35.9–50)
FERRITIN SERPL-MCNC: 27.9 NG/ML (ref 10–291)
HCT VFR BLD AUTO: 45.5 % (ref 37–47)
HGB BLD-MCNC: 14.3 G/DL (ref 12–16)
IMM GRANULOCYTES # BLD AUTO: 0.02 K/UL (ref 0–0.11)
IMM GRANULOCYTES NFR BLD AUTO: 0.2 % (ref 0–0.9)
IRON SATN MFR SERPL: 25 % (ref 15–55)
IRON SERPL-MCNC: 107 UG/DL (ref 40–170)
LYMPHOCYTES # BLD AUTO: 3.11 K/UL (ref 1–4.8)
LYMPHOCYTES NFR BLD: 37.4 % (ref 22–41)
MCH RBC QN AUTO: 27.7 PG (ref 27–33)
MCHC RBC AUTO-ENTMCNC: 31.4 G/DL (ref 33.6–35)
MCV RBC AUTO: 88.2 FL (ref 81.4–97.8)
MONOCYTES # BLD AUTO: 0.57 K/UL (ref 0–0.85)
MONOCYTES NFR BLD AUTO: 6.9 % (ref 0–13.4)
NEUTROPHILS # BLD AUTO: 4.5 K/UL (ref 2–7.15)
NEUTROPHILS NFR BLD: 54.2 % (ref 44–72)
NRBC # BLD AUTO: 0 K/UL
NRBC BLD-RTO: 0 /100 WBC
PLATELET # BLD AUTO: 348 K/UL (ref 164–446)
PMV BLD AUTO: 10.7 FL (ref 9–12.9)
RBC # BLD AUTO: 5.16 M/UL (ref 4.2–5.4)
TIBC SERPL-MCNC: 435 UG/DL (ref 250–450)
UIBC SERPL-MCNC: 328 UG/DL (ref 110–370)
WBC # BLD AUTO: 8.3 K/UL (ref 4.8–10.8)

## 2020-12-07 PROCEDURE — 85025 COMPLETE CBC W/AUTO DIFF WBC: CPT

## 2020-12-07 PROCEDURE — 36415 COLL VENOUS BLD VENIPUNCTURE: CPT

## 2020-12-07 PROCEDURE — 82728 ASSAY OF FERRITIN: CPT

## 2020-12-07 PROCEDURE — 83540 ASSAY OF IRON: CPT

## 2020-12-07 PROCEDURE — 83550 IRON BINDING TEST: CPT

## 2020-12-22 DIAGNOSIS — Z23 NEED FOR VACCINATION: ICD-10-CM

## 2020-12-24 ENCOUNTER — HOSPITAL ENCOUNTER (OUTPATIENT)
Dept: RADIOLOGY | Facility: MEDICAL CENTER | Age: 40
End: 2020-12-24
Attending: SPECIALIST
Payer: COMMERCIAL

## 2020-12-24 DIAGNOSIS — D25.9 UTERINE LEIOMYOMA, UNSPECIFIED LOCATION: ICD-10-CM

## 2020-12-24 DIAGNOSIS — N92.0 MENORRHAGIA WITH REGULAR CYCLE: ICD-10-CM

## 2020-12-24 DIAGNOSIS — N80.03 ADENOMYOSIS: ICD-10-CM

## 2020-12-24 PROCEDURE — 76830 TRANSVAGINAL US NON-OB: CPT

## 2020-12-29 ENCOUNTER — IMMUNIZATION (OUTPATIENT)
Dept: FAMILY PLANNING/WOMEN'S HEALTH CLINIC | Facility: IMMUNIZATION CENTER | Age: 40
End: 2020-12-29
Attending: FAMILY MEDICINE
Payer: COMMERCIAL

## 2020-12-29 DIAGNOSIS — Z23 NEED FOR VACCINATION: ICD-10-CM

## 2020-12-29 DIAGNOSIS — Z23 ENCOUNTER FOR VACCINATION: Primary | ICD-10-CM

## 2020-12-29 PROCEDURE — 0011A MODERNA SARS-COV-2 VACCINE: CPT

## 2020-12-29 PROCEDURE — 91301 MODERNA SARS-COV-2 VACCINE: CPT

## 2021-01-29 PROCEDURE — 0012A MODERNA SARS-COV-2 VACCINE: CPT

## 2021-01-29 PROCEDURE — 91301 MODERNA SARS-COV-2 VACCINE: CPT

## 2021-01-31 ENCOUNTER — IMMUNIZATION (OUTPATIENT)
Dept: FAMILY PLANNING/WOMEN'S HEALTH CLINIC | Facility: IMMUNIZATION CENTER | Age: 41
End: 2021-01-31
Payer: COMMERCIAL

## 2021-01-31 DIAGNOSIS — Z23 ENCOUNTER FOR VACCINATION: Primary | ICD-10-CM
